# Patient Record
Sex: FEMALE | Race: WHITE | NOT HISPANIC OR LATINO | URBAN - METROPOLITAN AREA
[De-identification: names, ages, dates, MRNs, and addresses within clinical notes are randomized per-mention and may not be internally consistent; named-entity substitution may affect disease eponyms.]

---

## 2017-09-02 ENCOUNTER — EMERGENCY (EMERGENCY)
Facility: HOSPITAL | Age: 47
LOS: 1 days | Discharge: HOME | End: 2017-09-02
Attending: EMERGENCY MEDICINE | Admitting: EMERGENCY MEDICINE
Payer: COMMERCIAL

## 2017-09-02 VITALS
HEART RATE: 64 BPM | OXYGEN SATURATION: 100 % | SYSTOLIC BLOOD PRESSURE: 122 MMHG | TEMPERATURE: 98 F | RESPIRATION RATE: 18 BRPM | DIASTOLIC BLOOD PRESSURE: 80 MMHG

## 2017-09-02 VITALS
SYSTOLIC BLOOD PRESSURE: 130 MMHG | RESPIRATION RATE: 20 BRPM | TEMPERATURE: 98 F | HEART RATE: 70 BPM | DIASTOLIC BLOOD PRESSURE: 66 MMHG | OXYGEN SATURATION: 100 %

## 2017-09-02 DIAGNOSIS — S50.311A ABRASION OF RIGHT ELBOW, INITIAL ENCOUNTER: ICD-10-CM

## 2017-09-02 DIAGNOSIS — Z91.010 ALLERGY TO PEANUTS: ICD-10-CM
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
## 2017-09-02 DIAGNOSIS — S50.312A ABRASION OF LEFT ELBOW, INITIAL ENCOUNTER: ICD-10-CM

## 2017-09-02 DIAGNOSIS — M25.521 PAIN IN RIGHT ELBOW: ICD-10-CM
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
## 2017-09-02 PROCEDURE — 99284 EMERGENCY DEPT VISIT MOD MDM: CPT | Mod: 25

## 2017-09-02 PROCEDURE — 73080 X-RAY EXAM OF ELBOW: CPT | Mod: 26,50
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
## 2017-09-02 NOTE — ED PROVIDER NOTE - MUSCULOSKELETAL, MLM
Spine appears normal, range of motion is not limited, no muscle or joint tenderness.  B/L upper extremities.  No elbow tenderness, no pain with extension, flexion, pronation, supination.

## 2017-09-02 NOTE — ED ADULT NURSE NOTE - CADM POA CENTRAL LINE
Patient has a cold for over 2 weeks now.   Was started on Prednisone 20mg po x 7 days along with Doxycycline 100 mg po BID x 14 days.     Patient has completed his Prednisone course.   Feels like there is mucus stuck in his lungs. Patient is not able to expectorate much.   Patients lungs feel tight. Audible expiratory wheezing heard.     Continues on Advair diskus BID along with albuterol inhaler 1-2x per day. Using OTC guaifensen Cough syrup 2x per day.     Wondering what he can do next?   No

## 2017-09-02 NOTE — ED PROVIDER NOTE - DIAGNOSTIC INTERPRETATION
ER Physician: Wilfredo Bruce  INTERPRETATION:  no acute fracture; no soft tissue swelling noted; normal bony alignment.
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
## 2017-09-02 NOTE — ED PROVIDER NOTE - PROGRESS NOTE DETAILS
neg for fracture.  Conservative management discussed with the patient in detail.  Close PMD follow up encouraged.  Strict ED return instructions discussed in detail and patient given the opportunity to ask any questions about their discharge diagnosis and instructions neg for fracture. Re-examined the patient and without signs of symptoms of head injury.  No abdominal tenderness.  Steady gait.  Conservative management discussed with the patient in detail - rest, otc pain medication prn.  Close PMD follow up encouraged.  Strict ED return instructions discussed in detail and patient given the opportunity to ask any questions about their discharge diagnosis and instructions

## 2021-01-12 PROBLEM — Z00.00 ENCOUNTER FOR PREVENTIVE HEALTH EXAMINATION: Status: ACTIVE | Noted: 2021-01-12

## 2021-01-13 ENCOUNTER — RESULT REVIEW (OUTPATIENT)
Age: 51
End: 2021-01-13

## 2021-01-13 ENCOUNTER — APPOINTMENT (OUTPATIENT)
Dept: ORTHOPEDIC SURGERY | Facility: CLINIC | Age: 51
End: 2021-01-13
Payer: COMMERCIAL

## 2021-01-13 ENCOUNTER — OUTPATIENT (OUTPATIENT)
Dept: OUTPATIENT SERVICES | Facility: HOSPITAL | Age: 51
LOS: 1 days | End: 2021-01-13
Payer: COMMERCIAL

## 2021-01-13 VITALS — WEIGHT: 150 LBS | BODY MASS INDEX: 26.58 KG/M2 | HEIGHT: 63 IN | RESPIRATION RATE: 16 BRPM

## 2021-01-13 DIAGNOSIS — Z78.9 OTHER SPECIFIED HEALTH STATUS: ICD-10-CM

## 2021-01-13 DIAGNOSIS — Z82.62 FAMILY HISTORY OF OSTEOPOROSIS: ICD-10-CM

## 2021-01-13 DIAGNOSIS — Z82.61 FAMILY HISTORY OF ARTHRITIS: ICD-10-CM

## 2021-01-13 DIAGNOSIS — S43.432A SUPERIOR GLENOID LABRUM LESION OF LEFT SHOULDER, INITIAL ENCOUNTER: ICD-10-CM

## 2021-01-13 PROCEDURE — 99203 OFFICE O/P NEW LOW 30 MIN: CPT

## 2021-01-13 PROCEDURE — 73030 X-RAY EXAM OF SHOULDER: CPT

## 2021-01-13 PROCEDURE — 73030 X-RAY EXAM OF SHOULDER: CPT | Mod: 26,LT

## 2021-01-13 PROCEDURE — 99072 ADDL SUPL MATRL&STAF TM PHE: CPT

## 2021-01-13 RX ORDER — FLUVOXAMINE MALEATE 50 MG/1
50 TABLET ORAL
Refills: 0 | Status: ACTIVE | COMMUNITY

## 2021-03-05 ENCOUNTER — APPOINTMENT (OUTPATIENT)
Dept: PHYSICAL MEDICINE AND REHAB | Facility: CLINIC | Age: 51
End: 2021-03-05
Payer: COMMERCIAL

## 2021-03-05 VITALS — WEIGHT: 150 LBS | OXYGEN SATURATION: 98 % | RESPIRATION RATE: 16 BRPM | HEIGHT: 63 IN | BODY MASS INDEX: 26.58 KG/M2

## 2021-03-05 PROCEDURE — 99203 OFFICE O/P NEW LOW 30 MIN: CPT

## 2021-03-05 PROCEDURE — 99072 ADDL SUPL MATRL&STAF TM PHE: CPT

## 2021-03-09 NOTE — PHYSICAL EXAM
[FreeTextEntry1] : LUMBAR\par GEN: AAOx3. NAD.\par LS ROM: Flexion fulll/pain free. Extension/oblique extension full (+) axial Sx. \par HIP ROM: Full and pain B/L.\par PALP: No TTP midline spinous processes, paravertebral muscles, SIJ, or greater trochanters B/L.\par INSP: Spine alignment is midline, with no evidence of scoliosis.\par STRENGTH: 4/5 B-HipABd. Otherwise 5/5 BLE\par SENS: Grossly intact to LT BLE.\par REFLEXES: Symmetric patella, achilles. \par TONE: Normal, No clonus.\par STANCE: No Trendelenburg with single leg stance.\par GAIT: Non antalgic, normal reciprocating heel to toe\par SPECIAL: SLR and Slump (-) bilaterally. FADIR/ADIA (-) B/L.

## 2021-03-09 NOTE — ASSESSMENT
[FreeTextEntry1] : Impression:\par 1. Lumbar Facet Syndrome\par \par Plan: The history and physical examination were reviewed. Symptoms are consistent with lumbago likely facet mediated along with underlying bilateral gluteal weakness. The diagnosis was discussed with the patient along with treatment options including physical therapy, oral medication, interventional spine procedures, and surgery; as well as alternative therapeutics such as acupuncture and massage. We also discussed the importance of weight loss, ergonomics, and posture as they pertain to the current condition as well as overall health and well being. I am recommending that the patient undergo a course of physical therapy. We emphasized the importance of the home exercise program. The patient was educated on red flag symptoms and was instructed to call the office should the current condition worsen or new symptoms develop. The patient will follow up in 6-8 weeks. The patient expressed verbal understanding and is in agreement with the plan of care. All of the patient's questions and concerns were addressed during today's visit.\par \par A total of 30 minutes was spent for the duration of this encounter.\par

## 2021-03-09 NOTE — HISTORY OF PRESENT ILLNESS
[FreeTextEntry1] : Ms. YUSEF NAVARRO is a very pleasant 50 year female who comes in for evaluation of Lower back pain that has been ongoing for 20 years without any specific injury or inciting event. The pain is located primarily on her lower back non-radiating intermittent and described as achy. The pain is rated as 2/10 and ranges from 1-6/10. The patient's symptoms are aggravated by walking and alleviated by lying down. The patient denies any night pain, numbness/tingling, weakness, or bowel/bladder dysfunction. The patient has no other complaints at this time.\par

## 2021-12-10 ENCOUNTER — APPOINTMENT (OUTPATIENT)
Dept: PHYSICAL MEDICINE AND REHAB | Facility: CLINIC | Age: 51
End: 2021-12-10
Payer: COMMERCIAL

## 2021-12-10 VITALS — WEIGHT: 150 LBS | BODY MASS INDEX: 26.58 KG/M2 | RESPIRATION RATE: 16 BRPM | HEIGHT: 63 IN

## 2021-12-10 PROCEDURE — 99213 OFFICE O/P EST LOW 20 MIN: CPT

## 2021-12-10 NOTE — HISTORY OF PRESENT ILLNESS
[FreeTextEntry1] : Ms. YUSEF NAVARRO is a very pleasant 50 year female who comes in for evaluation of Lower back pain. The patient has been doing PT/HEP since her previous visit. Despite being compliant with both formal sessions as well as HEP she has not achieved improvement. The pain remains located primarily on her lower back radiating to the buttock/groin, but not down the leg, intermittent and described as achy. The pain is rated as 2/10 and ranges from 1-6/10. The patient's symptoms are aggravated by walking and alleviated by lying down. The patient denies any night pain, numbness/tingling, weakness, or bowel/bladder dysfunction. The patient has no other complaints at this time.\par

## 2021-12-10 NOTE — ASSESSMENT
[FreeTextEntry1] : Impression:\par 1. Lumbar Facet Syndrome\par 2. LEFT SIJ Dysfunction - Hypermobility\par \par Plan: The history and physical examination were reviewed. The patient has not achieved improvement with PT/HEP. Symptoms seem to remain consistent with facet mediated LBP along with SIJ pain possibly in the setting of hypermobility. However the radiating pain to the groin could be L1/2 discogenic. The diagnosis was discussed with the patient along with treatment options. We will send the patient for MRI LSpine for further evaluation of potential spine etiology. The patient will follow up for imaging review. The patient expressed verbal understanding and is in agreement with the plan of care. All of the patient's questions and concerns were addressed during today's visit.\par

## 2021-12-21 ENCOUNTER — RESULT REVIEW (OUTPATIENT)
Age: 51
End: 2021-12-21

## 2021-12-21 ENCOUNTER — OUTPATIENT (OUTPATIENT)
Dept: OUTPATIENT SERVICES | Facility: HOSPITAL | Age: 51
LOS: 1 days | End: 2021-12-21

## 2021-12-21 ENCOUNTER — APPOINTMENT (OUTPATIENT)
Dept: MRI IMAGING | Facility: CLINIC | Age: 51
End: 2021-12-21
Payer: COMMERCIAL

## 2021-12-21 PROCEDURE — 72148 MRI LUMBAR SPINE W/O DYE: CPT | Mod: 26

## 2022-01-07 ENCOUNTER — APPOINTMENT (OUTPATIENT)
Dept: PHYSICAL MEDICINE AND REHAB | Facility: CLINIC | Age: 52
End: 2022-01-07

## 2022-01-28 ENCOUNTER — APPOINTMENT (OUTPATIENT)
Dept: PHYSICAL MEDICINE AND REHAB | Facility: CLINIC | Age: 52
End: 2022-01-28
Payer: COMMERCIAL

## 2022-01-28 VITALS — HEIGHT: 63 IN | WEIGHT: 150 LBS | BODY MASS INDEX: 26.58 KG/M2 | RESPIRATION RATE: 16 BRPM

## 2022-01-28 DIAGNOSIS — M54.50 LOW BACK PAIN, UNSPECIFIED: ICD-10-CM

## 2022-01-28 PROCEDURE — 99213 OFFICE O/P EST LOW 20 MIN: CPT

## 2022-02-22 PROBLEM — M54.50 LOW BACK PAIN WITHOUT SCIATICA, UNSPECIFIED BACK PAIN LATERALITY, UNSPECIFIED CHRONICITY: Status: ACTIVE | Noted: 2021-03-09

## 2022-03-06 ENCOUNTER — EMERGENCY (EMERGENCY)
Facility: HOSPITAL | Age: 52
LOS: 1 days | Discharge: HOME | End: 2022-03-06
Attending: EMERGENCY MEDICINE | Admitting: EMERGENCY MEDICINE
Payer: COMMERCIAL

## 2022-03-06 VITALS
HEIGHT: 63 IN | TEMPERATURE: 98 F | HEART RATE: 87 BPM | OXYGEN SATURATION: 97 % | SYSTOLIC BLOOD PRESSURE: 108 MMHG | WEIGHT: 149.91 LBS | RESPIRATION RATE: 16 BRPM | DIASTOLIC BLOOD PRESSURE: 66 MMHG

## 2022-03-06 LAB
ANION GAP SERPL CALC-SCNC: 6 MMOL/L — LOW (ref 9–16)
BASOPHILS # BLD AUTO: 0.04 K/UL — SIGNIFICANT CHANGE UP (ref 0–0.2)
BASOPHILS NFR BLD AUTO: 0.5 % — SIGNIFICANT CHANGE UP (ref 0–2)
BUN SERPL-MCNC: 10 MG/DL — SIGNIFICANT CHANGE UP (ref 7–23)
CALCIUM SERPL-MCNC: 9 MG/DL — SIGNIFICANT CHANGE UP (ref 8.5–10.5)
CHLORIDE SERPL-SCNC: 106 MMOL/L — SIGNIFICANT CHANGE UP (ref 96–108)
CO2 SERPL-SCNC: 26 MMOL/L — SIGNIFICANT CHANGE UP (ref 22–31)
CREAT SERPL-MCNC: 0.73 MG/DL — SIGNIFICANT CHANGE UP (ref 0.5–1.3)
EGFR: 101 ML/MIN/1.73M2 — SIGNIFICANT CHANGE UP
EOSINOPHIL # BLD AUTO: 0.16 K/UL — SIGNIFICANT CHANGE UP (ref 0–0.5)
EOSINOPHIL NFR BLD AUTO: 2 % — SIGNIFICANT CHANGE UP (ref 0–6)
GLUCOSE SERPL-MCNC: 95 MG/DL — SIGNIFICANT CHANGE UP (ref 70–99)
HCT VFR BLD CALC: 41.7 % — SIGNIFICANT CHANGE UP (ref 34.5–45)
HGB BLD-MCNC: 14.6 G/DL — SIGNIFICANT CHANGE UP (ref 11.5–15.5)
IMM GRANULOCYTES NFR BLD AUTO: 0.1 % — SIGNIFICANT CHANGE UP (ref 0–1.5)
LYMPHOCYTES # BLD AUTO: 2.66 K/UL — SIGNIFICANT CHANGE UP (ref 1–3.3)
LYMPHOCYTES # BLD AUTO: 32.7 % — SIGNIFICANT CHANGE UP (ref 13–44)
MCHC RBC-ENTMCNC: 31.5 PG — SIGNIFICANT CHANGE UP (ref 27–34)
MCHC RBC-ENTMCNC: 35 GM/DL — SIGNIFICANT CHANGE UP (ref 32–36)
MCV RBC AUTO: 90.1 FL — SIGNIFICANT CHANGE UP (ref 80–100)
MONOCYTES # BLD AUTO: 0.49 K/UL — SIGNIFICANT CHANGE UP (ref 0–0.9)
MONOCYTES NFR BLD AUTO: 6 % — SIGNIFICANT CHANGE UP (ref 2–14)
NEUTROPHILS # BLD AUTO: 4.78 K/UL — SIGNIFICANT CHANGE UP (ref 1.8–7.4)
NEUTROPHILS NFR BLD AUTO: 58.7 % — SIGNIFICANT CHANGE UP (ref 43–77)
NRBC # BLD: 0 /100 WBCS — SIGNIFICANT CHANGE UP (ref 0–0)
PLATELET # BLD AUTO: 257 K/UL — SIGNIFICANT CHANGE UP (ref 150–400)
POTASSIUM SERPL-MCNC: 3.5 MMOL/L — SIGNIFICANT CHANGE UP (ref 3.5–5.3)
POTASSIUM SERPL-SCNC: 3.5 MMOL/L — SIGNIFICANT CHANGE UP (ref 3.5–5.3)
RBC # BLD: 4.63 M/UL — SIGNIFICANT CHANGE UP (ref 3.8–5.2)
RBC # FLD: 12.1 % — SIGNIFICANT CHANGE UP (ref 10.3–14.5)
SODIUM SERPL-SCNC: 138 MMOL/L — SIGNIFICANT CHANGE UP (ref 132–145)
TROPONIN I, HIGH SENSITIVITY RESULT: 4.5 NG/L — SIGNIFICANT CHANGE UP
WBC # BLD: 8.14 K/UL — SIGNIFICANT CHANGE UP (ref 3.8–10.5)
WBC # FLD AUTO: 8.14 K/UL — SIGNIFICANT CHANGE UP (ref 3.8–10.5)

## 2022-03-06 PROCEDURE — 93010 ELECTROCARDIOGRAM REPORT: CPT | Mod: 59

## 2022-03-06 PROCEDURE — 99285 EMERGENCY DEPT VISIT HI MDM: CPT

## 2022-03-06 NOTE — ED PROVIDER NOTE - PHYSICAL EXAMINATION
VITAL SIGNS: I have reviewed nursing notes and confirm.  CONSTITUTIONAL: Well-developed; well-nourished; in no acute distress.  SKIN: Skin is warm and dry, no acute rash.  HEAD: Normocephalic; atraumatic.  EYES: Clear bilaterally.   ENT: No nasal discharge; airway clear.  NECK: Supple; non tender.  CARD: S1, S2 normal; no murmurs, gallops, or rubs. Regular rate and rhythm.  RESP: No wheezes, rales or rhonchi.  ABD: Soft; non-distended; non-tender.  EXT: Normal ROM. No clubbing, cyanosis or edema.  NEURO: Alert, oriented. Grossly unremarkable.  PSYCH: Cooperative, appropriate.

## 2022-03-06 NOTE — ED PROVIDER NOTE - CLINICAL SUMMARY MEDICAL DECISION MAKING FREE TEXT BOX
Patient presenting with chest discomfort that lasted for approximately 10-15 minutes today. Exam is unremarkable. Patient is very well-appearing and in NAD. Chest x-ray at  was unremarkable. Will obtain medical screening labs. If workup is negative, will DC with return precautions and cardiology f/u.

## 2022-03-06 NOTE — ED PROVIDER NOTE - OBJECTIVE STATEMENT
52 y/o female with PMHx of OCD and anxiety presenting with chest pressure that started approximately 5 hours PTA. Episode lasted approximately 10-15 minutes before slowly dissipating. Patient describes the chest pressure as a tightness that radiates to her neck. No other associated symptoms. Went to [facility] where she had an unremarkable chest x-ray and was referred to the ED for further evaluation and testing. Denies any recent drug use, EtOH, or vigorous exercise. Denies fever, chills, SOB, abdominal pain, N/V.

## 2022-03-06 NOTE — ED PROVIDER NOTE - CARE PROVIDER_API CALL
Kun Machado)  Cardiovascular Disease  [address] [facility] [place] [address]  Phone: [phone]  Fax: [phone]  Follow Up Time:

## 2022-03-08 DIAGNOSIS — R07.89 OTHER CHEST PAIN: ICD-10-CM

## 2022-03-08 DIAGNOSIS — Z91.010 ALLERGY TO PEANUTS: ICD-10-CM

## 2023-01-21 ENCOUNTER — EMERGENCY (EMERGENCY)
Facility: HOSPITAL | Age: 53
LOS: 1 days | Discharge: HOME | End: 2023-01-21
Admitting: EMERGENCY MEDICINE
Payer: COMMERCIAL

## 2023-01-21 VITALS
RESPIRATION RATE: 19 BRPM | HEIGHT: 63 IN | TEMPERATURE: 98 F | OXYGEN SATURATION: 97 % | SYSTOLIC BLOOD PRESSURE: 119 MMHG | WEIGHT: 149.91 LBS | HEART RATE: 57 BPM | DIASTOLIC BLOOD PRESSURE: 89 MMHG

## 2023-01-21 VITALS
HEART RATE: 60 BPM | RESPIRATION RATE: 19 BRPM | SYSTOLIC BLOOD PRESSURE: 120 MMHG | DIASTOLIC BLOOD PRESSURE: 88 MMHG | OXYGEN SATURATION: 98 %

## 2023-01-21 PROBLEM — F41.9 ANXIETY DISORDER, UNSPECIFIED: Chronic | Status: ACTIVE | Noted: 2022-03-06

## 2023-01-21 PROBLEM — F42.9 OBSESSIVE-COMPULSIVE DISORDER, UNSPECIFIED: Chronic | Status: ACTIVE | Noted: 2022-03-06

## 2023-01-21 PROCEDURE — 73590 X-RAY EXAM OF LOWER LEG: CPT | Mod: 26,RT

## 2023-01-21 PROCEDURE — 99284 EMERGENCY DEPT VISIT MOD MDM: CPT

## 2023-01-21 RX ORDER — METHOCARBAMOL 500 MG/1
2 TABLET, FILM COATED ORAL
Qty: 30 | Refills: 0
Start: 2023-01-21 | End: 2023-01-25

## 2023-01-21 RX ORDER — ACETAMINOPHEN 500 MG
2 TABLET ORAL
Qty: 60 | Refills: 0
Start: 2023-01-21 | End: 2023-01-30

## 2023-01-21 RX ORDER — METHOCARBAMOL 500 MG/1
1500 TABLET, FILM COATED ORAL ONCE
Refills: 0 | Status: COMPLETED | OUTPATIENT
Start: 2023-01-21 | End: 2023-01-21

## 2023-01-21 RX ADMIN — Medication 500 MILLIGRAM(S): at 13:24

## 2023-01-21 RX ADMIN — METHOCARBAMOL 1500 MILLIGRAM(S): 500 TABLET, FILM COATED ORAL at 13:23

## 2023-01-21 NOTE — ED PROVIDER NOTE - OBJECTIVE STATEMENT
52-year-old female, past medical history OCD and anxiety, presenting to the emergency department complaining of right calf pain after hearing a popping sound while in ballet class today.  Patient states she has been able to walk on it since the injury however she endorses discomfort when ambulating.  Patient has not taken any medications for pain.  Denies any paresthesias, leg swelling, headache, dizziness, back pain, nausea, vomiting, chest pain, or shortness of breath.

## 2023-01-21 NOTE — ED ADULT NURSE NOTE - NSFALLRSKPASTHIST_ED_ALL_ED
02/25/20 1520   Cough/Sputum   $Obtained Sample $Induced Sputum  (Rapid flu swab obtained and sent to lab. ) no

## 2023-01-21 NOTE — ED PROVIDER NOTE - PHYSICAL EXAMINATION
VITAL SIGNS: I have reviewed nursing notes and confirm.  CONSTITUTIONAL: Well-developed; well-nourished; in no acute distress.  SKIN: Skin is warm and dry, no acute rash.  HEAD: Normocephalic; atraumatic.  NECK: Supple; non tender.  CARD: S1, S2 normal; no murmurs, gallops, or rubs. Regular rate and rhythm.  RESP: No wheezes, rales or rhonchi.  ABD: Soft; non-distended; non-tender; no rebound or guarding.  EXT: +ttp along the R calf, no swelling, DPI, FROM of the knee and ankle.  NEURO: Alert, oriented. Grossly unremarkable. VALLES, normal tone, no gross motor or sensory changes. Fluent speech.   PSYCH: Cooperative, appropriate. Mood and affect wnl.

## 2023-01-21 NOTE — ED PROVIDER NOTE - CLINICAL SUMMARY MEDICAL DECISION MAKING FREE TEXT BOX
52-year-old female, past medical history, presenting to the emergency department complaining of right calf pain after hearing a popping sound while in ballet class today. Patient found to have tenderness to palpation along the right calf without any overt swelling or underlying signs of edema. Clinically suspecting partial muscular tear vs strain. X-ray shows no acute bony abnormalities.  Patient given methocarbamol and Naprosyn with some improvement.  She was also given ice upon originally arriving to the emergency room.  Area wrapped with an Ace bandage and patient given cane; she is able to ambulate well.  Will dispo to follow-up with orthopedics for further management.  Return precautions discussed and patient stable on discharge.

## 2023-01-21 NOTE — ED PROVIDER NOTE - PROVIDER TOKENS
PROVIDER:[TOKEN:[[id number]],FOLLOWUP:[1-3 Days]],PROVIDER:[TOKEN:[[id number]],FOLLOWUP:[1-3 Days]]

## 2023-01-21 NOTE — ED PROVIDER NOTE - NS ED ROS FT
+calf pain  Denies fevers, chills, nausea, vomiting, diarrhea, constipation, abdominal pain, urinary symptoms, chest pain, palpitations, shortness of breath, dyspnea on exertion, syncope/near syncope, cough/URI symptoms, headache, weakness, numbness, focal deficits, visual changes, gait or balance changes, dizziness

## 2023-01-21 NOTE — ED PROVIDER NOTE - PATIENT PORTAL LINK FT
You can access the FollowMyHealth Patient Portal offered by [facility] by registering at the following website: [email or web address]. By joining [employer]’s FollowMyHealth portal, you will also be able to view your health information using other applications (apps) compatible with our system.

## 2023-01-21 NOTE — ED PROVIDER NOTE - CARE PROVIDER_API CALL
Prosper Torres)  Orthopaedic Surgery Surgery  [address] [place] [address]  Phone: [phone]  Fax: [phone]  Follow Up Time: 1-3 Days    [place] Fuentes)  Orthopaedic Surgery Surgery  [address] [place] [address]  Phone: [phone]  Fax: [phone]  Follow Up Time: 1-3 Days

## 2023-01-21 NOTE — ED ADULT TRIAGE NOTE - CHIEF COMPLAINT QUOTE
Pt was in ballet class when she heard a "pop" in her right calf. Denies falling. Able to bare weight but w/ pain.

## 2023-01-21 NOTE — ED PROVIDER NOTE - CARE PROVIDERS DIRECT ADDRESSES
[email or web address]

## 2023-01-23 ENCOUNTER — APPOINTMENT (OUTPATIENT)
Dept: ORTHOPEDIC SURGERY | Facility: CLINIC | Age: 53
End: 2023-01-23
Payer: COMMERCIAL

## 2023-01-23 VITALS — WEIGHT: 150 LBS | HEIGHT: 63 IN | RESPIRATION RATE: 16 BRPM | BODY MASS INDEX: 26.58 KG/M2
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
## 2023-01-23 DIAGNOSIS — F42.9 OBSESSIVE-COMPULSIVE DISORDER, UNSPECIFIED: ICD-10-CM

## 2023-01-23 DIAGNOSIS — Z91.010 ALLERGY TO PEANUTS: ICD-10-CM

## 2023-01-23 DIAGNOSIS — S96.811A STRAIN OF OTHER SPECIFIED MUSCLES AND TENDONS AT ANKLE AND FOOT LEVEL, RIGHT FOOT, INITIAL ENCOUNTER: ICD-10-CM

## 2023-01-23 DIAGNOSIS — S96.812A STRAIN OF OTHER SPECIFIED MUSCLES AND TENDONS AT ANKLE AND FOOT LEVEL, LEFT FOOT, INITIAL ENCOUNTER: ICD-10-CM

## 2023-01-23 DIAGNOSIS — F41.9 ANXIETY DISORDER, UNSPECIFIED: ICD-10-CM

## 2023-01-23 DIAGNOSIS — S86.911A STRAIN OF UNSPECIFIED MUSCLE(S) AND TENDON(S) AT LOWER LEG LEVEL, RIGHT LEG, INITIAL ENCOUNTER: ICD-10-CM

## 2023-01-23 DIAGNOSIS — M79.661 PAIN IN RIGHT LOWER LEG: ICD-10-CM
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
## 2023-01-23 DIAGNOSIS — S86.111A STRAIN OF OTHER MUSCLE(S) AND TENDON(S) OF POSTERIOR MUSCLE GROUP AT LOWER LEG LEVEL, RIGHT LEG, INITIAL ENCOUNTER: ICD-10-CM

## 2023-01-23 PROCEDURE — 99213 OFFICE O/P EST LOW 20 MIN: CPT

## 2023-01-23 PROCEDURE — 76882 US LMTD JT/FCL EVL NVASC XTR: CPT | Mod: RT

## 2023-06-08 NOTE — ED PROVIDER NOTE - HISTORY ATTESTATION, MLM

## 2023-08-14 ENCOUNTER — APPOINTMENT (OUTPATIENT)
Dept: ULTRASOUND IMAGING | Facility: CLINIC | Age: 53
End: 2023-08-14
Payer: COMMERCIAL

## 2023-08-14 ENCOUNTER — OUTPATIENT (OUTPATIENT)
Dept: OUTPATIENT SERVICES | Facility: HOSPITAL | Age: 53
LOS: 1 days | End: 2023-08-14

## 2023-08-14 PROCEDURE — 93971 EXTREMITY STUDY: CPT | Mod: 26,LT

## 2023-10-11 ENCOUNTER — APPOINTMENT (OUTPATIENT)
Dept: ORTHOPEDIC SURGERY | Facility: CLINIC | Age: 53
End: 2023-10-11

## 2024-09-03 ENCOUNTER — EMERGENCY (EMERGENCY)
Facility: HOSPITAL | Age: 54
LOS: 1 days | Discharge: HOME | End: 2024-09-03
Admitting: EMERGENCY MEDICINE
Payer: COMMERCIAL

## 2024-09-03 VITALS
DIASTOLIC BLOOD PRESSURE: 73 MMHG | OXYGEN SATURATION: 97 % | TEMPERATURE: 98 F | HEART RATE: 63 BPM | RESPIRATION RATE: 16 BRPM | SYSTOLIC BLOOD PRESSURE: 108 MMHG

## 2024-09-03 DIAGNOSIS — Z91.010 ALLERGY TO PEANUTS: ICD-10-CM

## 2024-09-03 DIAGNOSIS — R20.2 PARESTHESIA OF SKIN: ICD-10-CM

## 2024-09-03 PROCEDURE — 99283 EMERGENCY DEPT VISIT LOW MDM: CPT

## 2024-09-03 NOTE — ED PROVIDER NOTE - OBJECTIVE STATEMENT
52 yo f pmhx sig for hair loss and OCD here with acute onset of perioral paresthesias mild waxing and waning in severity ongoing for 1 d in duration with no sob, no cp, no palpitations, no loc, no n/v. Symptoms began after pt started taking spironolactone for hair loss.    I have reviewed available current nursing and previous documentation of past medical, surgical, family, and/or social history.

## 2024-09-03 NOTE — ED PROVIDER NOTE - PATIENT PORTAL LINK FT
You can access the FollowMyHealth Patient Portal offered by [facility] by registering at the following website: [email or web address]. By joining [employer]’s FollowMyHealth portal, you will also be able to view your health information using other applications (apps) compatible with our system.

## 2024-09-03 NOTE — ED ADULT TRIAGE NOTE - CHIEF COMPLAINT QUOTE
Pt walked in c/o allergic reaction. States had hemp protein powder this morning around 7-730AM, began having mild tingling to the lips and throat throughout the day. No respiratory distress, no difficulty swallowing, no wheezing.

## 2024-09-03 NOTE — ED PROVIDER NOTE - PHYSICAL EXAMINATION
Physical Exam    Vital Signs: I have reviewed the initial vital signs.  Constitutional: well-appearing, appears stated age  Eyes: PERRLA, EOM intact, RAPD absent, and symmetrical lids.  ENT: neck supple with no adenopathy, moist MM, uvula midline, no airway edema  Cardiovascular: +S1/S2, no murmurs, regular rate, regular rhythm, well-perfused extremities  Respiratory: unlabored respiratory effort, clear to auscultation bilaterally, speaks in full sentences  Gastrointestinal: soft, non-tender abdomen, no pulsatile mass  Integumentary: no rash, no urticaria

## 2024-09-03 NOTE — ED PROVIDER NOTE - CLINICAL SUMMARY MEDICAL DECISION MAKING FREE TEXT BOX
well appearing pt here with waxing and waning perioral paresthesias with no sob or wheezing, no n/v, normal exam with clear lung sounds, no signs of allergic reaction likely side effect from spironolactone plan: pt feels reassured to dc

## 2024-09-23 ENCOUNTER — EMERGENCY (EMERGENCY)
Facility: HOSPITAL | Age: 54
LOS: 1 days | Discharge: HOME | End: 2024-09-23
Attending: EMERGENCY MEDICINE | Admitting: EMERGENCY MEDICINE
Payer: COMMERCIAL

## 2024-09-23 VITALS
HEART RATE: 61 BPM | DIASTOLIC BLOOD PRESSURE: 76 MMHG | RESPIRATION RATE: 16 BRPM | WEIGHT: 149.91 LBS | HEIGHT: 62.75 IN | TEMPERATURE: 99 F | OXYGEN SATURATION: 96 % | SYSTOLIC BLOOD PRESSURE: 116 MMHG
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
## 2024-09-23 DIAGNOSIS — R20.0 ANESTHESIA OF SKIN: ICD-10-CM

## 2024-09-23 DIAGNOSIS — S84.21XA: ICD-10-CM

## 2024-09-23 DIAGNOSIS — F32.A DEPRESSION, UNSPECIFIED: ICD-10-CM

## 2024-09-23 DIAGNOSIS — Z91.013 ALLERGY TO SEAFOOD: ICD-10-CM

## 2024-09-23 DIAGNOSIS — Z91.010 ALLERGY TO PEANUTS: ICD-10-CM
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
## 2024-09-23 PROCEDURE — 99284 EMERGENCY DEPT VISIT MOD MDM: CPT

## 2024-09-23 NOTE — ED ADULT NURSE NOTE - OBJECTIVE STATEMENT
pt presents to ed reporting diminished sensation to patch of right lower leg that has been ongoing x  2 years and then 2 days ago reports it started radiating down to lower leg and up to right knee. has recently been doing a new exercise at the gym focusing on her quads. no other numbness/tingling   has been seen by the orthopedic doctor and been told it can be an inflammation of plexus nerve

## 2024-09-23 NOTE — ED ADULT TRIAGE NOTE - CHIEF COMPLAINT QUOTE
Pt. walk in for a patch of decreased sensation below R. knee since 2019 that has spread to R. top of foot and R. shin x 2 days. NIH 0

## 2024-09-24 VITALS
DIASTOLIC BLOOD PRESSURE: 67 MMHG | HEART RATE: 64 BPM | OXYGEN SATURATION: 99 % | RESPIRATION RATE: 18 BRPM | SYSTOLIC BLOOD PRESSURE: 105 MMHG | TEMPERATURE: 98 F

## 2024-09-24 LAB
ALBUMIN SERPL ELPH-MCNC: 3.9 G/DL — SIGNIFICANT CHANGE UP (ref 3.4–5)
ALP SERPL-CCNC: 122 U/L — HIGH (ref 40–120)
ALT FLD-CCNC: 13 U/L — SIGNIFICANT CHANGE UP (ref 12–42)
ANION GAP SERPL CALC-SCNC: 6 MMOL/L — LOW (ref 9–16)
AST SERPL-CCNC: 17 U/L — SIGNIFICANT CHANGE UP (ref 15–37)
BASOPHILS # BLD AUTO: 0.05 K/UL — SIGNIFICANT CHANGE UP (ref 0–0.2)
BASOPHILS NFR BLD AUTO: 0.6 % — SIGNIFICANT CHANGE UP (ref 0–2)
BILIRUB SERPL-MCNC: 0.2 MG/DL — SIGNIFICANT CHANGE UP (ref 0.2–1.2)
BUN SERPL-MCNC: 23 MG/DL — SIGNIFICANT CHANGE UP (ref 7–23)
CALCIUM SERPL-MCNC: 9.2 MG/DL — SIGNIFICANT CHANGE UP (ref 8.5–10.5)
CHLORIDE SERPL-SCNC: 106 MMOL/L — SIGNIFICANT CHANGE UP (ref 96–108)
CK SERPL-CCNC: 79 U/L — SIGNIFICANT CHANGE UP (ref 26–192)
CO2 SERPL-SCNC: 29 MMOL/L — SIGNIFICANT CHANGE UP (ref 22–31)
CREAT SERPL-MCNC: 0.86 MG/DL — SIGNIFICANT CHANGE UP (ref 0.5–1.3)
EGFR: 82 ML/MIN/1.73M2 — SIGNIFICANT CHANGE UP
EOSINOPHIL # BLD AUTO: 1.32 K/UL — HIGH (ref 0–0.5)
EOSINOPHIL NFR BLD AUTO: 15.2 % — HIGH (ref 0–6)
GLUCOSE SERPL-MCNC: 95 MG/DL — SIGNIFICANT CHANGE UP (ref 70–99)
HCT VFR BLD CALC: 43.1 % — SIGNIFICANT CHANGE UP (ref 34.5–45)
HGB BLD-MCNC: 14.6 G/DL — SIGNIFICANT CHANGE UP (ref 11.5–15.5)
IMM GRANULOCYTES NFR BLD AUTO: 0.2 % — SIGNIFICANT CHANGE UP (ref 0–0.9)
LYMPHOCYTES # BLD AUTO: 3.38 K/UL — HIGH (ref 1–3.3)
LYMPHOCYTES # BLD AUTO: 38.9 % — SIGNIFICANT CHANGE UP (ref 13–44)
MAGNESIUM SERPL-MCNC: 2.1 MG/DL — SIGNIFICANT CHANGE UP (ref 1.6–2.6)
MCHC RBC-ENTMCNC: 31.5 PG — SIGNIFICANT CHANGE UP (ref 27–34)
MCHC RBC-ENTMCNC: 33.9 GM/DL — SIGNIFICANT CHANGE UP (ref 32–36)
MCV RBC AUTO: 92.9 FL — SIGNIFICANT CHANGE UP (ref 80–100)
MONOCYTES # BLD AUTO: 0.52 K/UL — SIGNIFICANT CHANGE UP (ref 0–0.9)
MONOCYTES NFR BLD AUTO: 6 % — SIGNIFICANT CHANGE UP (ref 2–14)
NEUTROPHILS # BLD AUTO: 3.41 K/UL — SIGNIFICANT CHANGE UP (ref 1.8–7.4)
NEUTROPHILS NFR BLD AUTO: 39.1 % — LOW (ref 43–77)
NRBC # BLD: 0 /100 WBCS — SIGNIFICANT CHANGE UP (ref 0–0)
PLATELET # BLD AUTO: 265 K/UL — SIGNIFICANT CHANGE UP (ref 150–400)
POTASSIUM SERPL-MCNC: 3.9 MMOL/L — SIGNIFICANT CHANGE UP (ref 3.5–5.3)
POTASSIUM SERPL-SCNC: 3.9 MMOL/L — SIGNIFICANT CHANGE UP (ref 3.5–5.3)
PROT SERPL-MCNC: 7.8 G/DL — SIGNIFICANT CHANGE UP (ref 6.4–8.2)
RBC # BLD: 4.64 M/UL — SIGNIFICANT CHANGE UP (ref 3.8–5.2)
RBC # FLD: 12 % — SIGNIFICANT CHANGE UP (ref 10.3–14.5)
SODIUM SERPL-SCNC: 141 MMOL/L — SIGNIFICANT CHANGE UP (ref 132–145)
WBC # BLD: 8.7 K/UL — SIGNIFICANT CHANGE UP (ref 3.8–10.5)
WBC # FLD AUTO: 8.7 K/UL — SIGNIFICANT CHANGE UP (ref 3.8–10.5)

## 2024-09-24 NOTE — ED PROVIDER NOTE - OBJECTIVE STATEMENT
53-year-old female past medical history of mild depression on buspirone and spironolactone, and chronic patch of numbness to right superior lateral tib-fib region.  In the past she has been sent to orthopedic surgery for evaluation of this numbness which is localized to a circular patch in the upper tib-fib region on the right lateral leg.  Patient did a workout today in the gym involving flexion of lower extremities  against a bar and notes that the decreased sensation has spread over the knee and the right lower extremity up to the ankle.  Patient denies weakness she is able to walk normally denies recent falls denies pelvic groin pain, denies lower back pain, denies headaches or neck pain.  Patient now notes while being in the ED the symptoms have improved significantly.  In the past orthopedic surgery and neurology has followed her for this small circular patch of numbness no medications have been advised

## 2024-09-24 NOTE — ED PROVIDER NOTE - PATIENT PORTAL LINK FT
You can access the FollowMyHealth Patient Portal offered by [facility] by registering at the following website: [email or web address]. By joining [employer]’s FollowMyHealth portal, you will also be able to view your health information using other applications (apps) compatible with our system.

## 2024-09-24 NOTE — ED PROVIDER NOTE - CARE PROVIDER_API CALL
Aba Arnold  Orthopaedic Sports Medicine  [address]  [place] [address]  Phone: [phone]  Fax: [phone]  Follow Up Time:

## 2024-09-24 NOTE — ED PROVIDER NOTE - NSFOLLOWUPINSTRUCTIONS_ED_ALL_ED_FT
Peripheral Neuropathy  Peripheral neuropathy is a type of nerve damage. It affects nerves that carry signals between the spinal cord and the arms, legs, and the rest of the body (peripheral nerves). It does not affect nerves in the spinal cord or brain. In peripheral neuropathy, one nerve or a group of nerves may be damaged. Peripheral neuropathy is a broad category that includes many specific nerve disorders, like diabetic neuropathy, hereditary neuropathy, and carpal tunnel syndrome.    What are the causes?  This condition may be caused by:  Certain diseases, such as:  Diabetes. This is the most common cause of peripheral neuropathy.  Autoimmune diseases, such as rheumatoid arthritis and systemic lupus erythematosus.  Nerve diseases that are passed from parent to child (inherited).  Kidney disease.  Thyroid disease.  Other causes may include:  Nerve injury.  Pressure or stress on a nerve that lasts a long time.  Lack (deficiency) of B vitamins. This can result from alcoholism, poor diet, or a restricted diet.  Infections.  Some medicines, such as cancer medicines (chemotherapy).  Poisonous (toxic) substances, such as lead and mercury.  Too little blood flowing to the legs.  In some cases, the cause of this condition is not known.    What are the signs or symptoms?  Symptoms of this condition depend on which of your nerves is damaged.  Symptoms in the legs, hands, and arms can include:  Loss of feeling (numbness) in the feet, hands, or both.  Tingling in the feet, hands, or both.  Burning pain.  Very sensitive skin.  Weakness.  Not being able to move a part of the body (paralysis).  Clumsiness or poor coordination.  Muscle twitching.  Loss of balance.  Symptoms in other parts of the body can include:  Not being able to control your bladder.  Feeling dizzy.  Sexual problems.  How is this diagnosed?  Diagnosing and finding the cause of peripheral neuropathy can be difficult. Your health care provider will take your medical history and do a physical exam. A neurological exam will also be done. This involves checking things that are affected by your brain, spinal cord, and nerves (nervous system). For example, your health care provider will check your reflexes, how you move, and what you can feel.    You may have other tests, such as:  Blood tests.  Electromyogram (EMG) and nerve conduction tests. These tests check nerve function and how well the nerves are controlling the muscles.  Imaging tests, such as a CT scan or MRI, to rule out other causes of your symptoms.  Removing a small piece of nerve to be examined in a lab (nerve biopsy).  Removing and examining a small amount of the fluid that surrounds the brain and spinal cord (lumbar puncture).  How is this treated?  Treatment for this condition may involve:  Treating the underlying cause of the neuropathy, such as diabetes, kidney disease, or vitamin deficiencies.  Stopping medicines that can cause neuropathy, such as chemotherapy.  Medicine to help relieve pain. Medicines may include:  Prescription or over-the-counter pain medicine.  Anti-seizure medicine.  Antidepressants.  Pain-relieving patches that are applied to painful areas of skin.  Surgery to relieve pressure on a nerve or to destroy a nerve that is causing pain.  Physical therapy to help improve movement and balance.  Devices to help you move around (assistive devices).  Follow these instructions at home:  Medicines    Take over-the-counter and prescription medicines only as told by your health care provider. Do not take any other medicines without first asking your health care provider.  Ask your health care provider if the medicine prescribed to you requires you to avoid driving or using machinery.  Lifestyle    A plate along with examples of foods in a healthy diet.  Do not use any products that contain nicotine or tobacco. These products include cigarettes, chewing tobacco, and vaping devices, such as e-cigarettes. Smoking keeps blood from reaching damaged nerves. If you need help quitting, ask your health care provider.  Avoid or limit alcohol. Too much alcohol can cause a vitamin B deficiency, and vitamin B is needed for healthy nerves.  Eat a healthy diet. This includes:  Eating foods that are high in fiber, such as beans, whole grains, and fresh fruits and vegetables.  Limiting foods that are high in fat and processed sugars, such as fried or sweet foods.  General instructions    Barefoot person sitting with right leg crossed over left, using handheld mirror to see bottom of right foot.  If you have diabetes, work closely with your health care provider to keep your blood sugar under control.  If you have numbness in your feet:  Check every day for signs of injury or infection. Watch for redness, warmth, and swelling.  Wear padded socks and comfortable shoes. These help protect your feet.  Develop a good support system. Living with peripheral neuropathy can be stressful. Consider talking with a mental health specialist or joining a support group.  Use assistive devices and attend physical therapy as told by your health care provider. This may include using a walker or a cane.  Keep all follow-up visits. This is important.  Where to find more information  [employer] [place] of Neurological Disorders: www.ninds.nih.gov  Contact a health care provider if:  You have new signs or symptoms of peripheral neuropathy.  You are struggling emotionally from dealing with peripheral neuropathy.  Your pain is not well controlled.  Get help right away if:  You have an injury or infection that is not healing normally.  You develop new weakness in an arm or leg.  You have fallen or do so frequently.  Summary  Peripheral neuropathy is when the nerves in the arms or legs are damaged, resulting in numbness, weakness, or pain.  There are many causes of peripheral neuropathy, including diabetes, pinched nerves, vitamin deficiencies, autoimmune disease, and hereditary conditions.  Diagnosing and finding the cause of peripheral neuropathy can be difficult. Your health care provider will take your medical history, do a physical exam, and do tests, including blood tests and nerve function tests.  Treatment involves treating the underlying cause of the neuropathy and taking medicines to help control pain. Physical therapy and assistive devices may also help.  This information is not intended to replace advice given to you by your health care provider. Make sure you discuss any questions you have with your health care provider.

## 2024-09-24 NOTE — ED PROVIDER NOTE - CLINICAL SUMMARY MEDICAL DECISION MAKING FREE TEXT BOX
53-year-old female past medical history of mild depression on buspirone and spironolactone, and chronic patch of numbness to right superior lateral tib-fib region.  In the past she has been sent to orthopedic surgery for evaluation of this numbness which is localized to a circular patch in the upper tib-fib region on the right lateral leg.  Patient did a workout today in the gym involving flexion of lower extremities  against a bar and notes that the decreased sensation has spread over the knee and the right lower extremity up to the ankle.  Patient denies weakness she is able to walk normally denies recent falls denies pelvic groin pain, denies lower back pain, denies headaches or neck pain.  Patient now notes while being in the ED the symptoms have improved significantly.  In the past orthopedic surgery and neurology has followed her for this small circular patch of numbness no medications have been advised    Plan is to check electrolytes rule out electrolyte imbalance and check CPK my suspicion is low for rhabdomyolysis.  Patient has had prior cutaneous nerve changes over right lower extremity.  If negative plan to refer to orthopedic surgery and neurology for possible placement on Lyrica and evaluation further.  Patient agrees with plan I do not suspect DVT, compartment syndrome or acute infectious process.  I do not suspect central nervous process such as stroke or bleed

## 2024-10-07 ENCOUNTER — APPOINTMENT (OUTPATIENT)
Dept: ORTHOPEDIC SURGERY | Facility: CLINIC | Age: 54
End: 2024-10-07
Payer: COMMERCIAL

## 2024-10-07 VITALS — WEIGHT: 150 LBS | HEIGHT: 62.5 IN | BODY MASS INDEX: 26.91 KG/M2

## 2024-10-07 DIAGNOSIS — M54.17 RADICULOPATHY, LUMBOSACRAL REGION: ICD-10-CM

## 2024-10-07 PROCEDURE — 99213 OFFICE O/P EST LOW 20 MIN: CPT

## 2024-10-30 ENCOUNTER — NON-APPOINTMENT (OUTPATIENT)
Age: 54
End: 2024-10-30
